# Patient Record
Sex: MALE | URBAN - METROPOLITAN AREA
[De-identification: names, ages, dates, MRNs, and addresses within clinical notes are randomized per-mention and may not be internally consistent; named-entity substitution may affect disease eponyms.]

---

## 2023-03-22 ENCOUNTER — APPOINTMENT (RX ONLY)
Dept: URBAN - METROPOLITAN AREA CLINIC 95 | Facility: CLINIC | Age: 58
Setting detail: DERMATOLOGY
End: 2023-03-22

## 2023-03-22 DIAGNOSIS — D49.2 NEOPLASM OF UNSPECIFIED BEHAVIOR OF BONE, SOFT TISSUE, AND SKIN: ICD-10-CM

## 2023-03-22 DIAGNOSIS — L72.8 OTHER FOLLICULAR CYSTS OF THE SKIN AND SUBCUTANEOUS TISSUE: ICD-10-CM

## 2023-03-22 DIAGNOSIS — L28.0 LICHEN SIMPLEX CHRONICUS: ICD-10-CM | Status: INADEQUATELY CONTROLLED

## 2023-03-22 PROBLEM — L30.9 DERMATITIS, UNSPECIFIED: Status: ACTIVE | Noted: 2023-03-22

## 2023-03-22 PROCEDURE — ? BIOPSY BY PUNCH METHOD

## 2023-03-22 PROCEDURE — 99214 OFFICE O/P EST MOD 30 MIN: CPT | Mod: 25

## 2023-03-22 PROCEDURE — ? PRESCRIPTION

## 2023-03-22 PROCEDURE — 11104 PUNCH BX SKIN SINGLE LESION: CPT

## 2023-03-22 PROCEDURE — ? COUNSELING

## 2023-03-22 RX ORDER — CLOBETASOL PROPIONATE 0.5 MG/G
OINTMENT TOPICAL TWICE DAILY
Qty: 60 | Refills: 1 | Status: ERX | COMMUNITY
Start: 2023-03-22

## 2023-03-22 RX ADMIN — CLOBETASOL PROPIONATE: 0.5 OINTMENT TOPICAL at 00:00

## 2023-03-22 ASSESSMENT — LOCATION DETAILED DESCRIPTION DERM
LOCATION DETAILED: RIGHT KNEE
LOCATION DETAILED: RIGHT LATERAL MALLEOLUS
LOCATION DETAILED: LEFT SUPERIOR UPPER BACK
LOCATION DETAILED: RIGHT ANKLE
LOCATION DETAILED: RIGHT KNEE

## 2023-03-22 ASSESSMENT — LOCATION SIMPLE DESCRIPTION DERM
LOCATION SIMPLE: LEFT UPPER BACK
LOCATION SIMPLE: RIGHT KNEE
LOCATION SIMPLE: RIGHT FOOT
LOCATION SIMPLE: RIGHT ANKLE
LOCATION SIMPLE: RIGHT KNEE

## 2023-03-22 ASSESSMENT — LOCATION ZONE DERM
LOCATION ZONE: TRUNK
LOCATION ZONE: LEG
LOCATION ZONE: LEG
LOCATION ZONE: FEET

## 2023-03-22 NOTE — PROCEDURE: BIOPSY BY PUNCH METHOD
Body Location Override (Optional - Billing Will Still Be Based On Selected Body Map Location If Applicable): right lateral ankle
Detail Level: Detailed
Was A Bandage Applied: Yes
Punch Size In Mm: 4
Size Of Lesion In Cm (Optional): 0
Depth Of Punch Biopsy: dermis
Biopsy Type: H and E
Anesthesia Type: 1% lidocaine with epinephrine
Anesthesia Volume In Cc (Will Not Render If 0): 0.5
Hemostasis: None
Epidermal Sutures: 4-0 Ethilon
Wound Care: Petrolatum
Dressing: bandage
Suture Removal: 14 days
Patient Will Remove Sutures At Home?: No
Lab: -[id number]
Consent: Written consent was obtained and risks were reviewed including but not limited to scarring, infection, bleeding, scabbing, incomplete removal, nerve damage and allergy to anesthesia.
Post-Care Instructions: I reviewed with the patient in detail post-care instructions. Patient is to keep the biopsy site dry overnight, and then apply bacitracin twice daily until healed. Patient may apply hydrogen peroxide soaks to remove any crusting.
Home Suture Removal Text: Patient was provided a home suture removal kit and will remove their sutures at home. If they have any questions or difficulties they will call the office.
Notification Instructions: Patient will be notified of biopsy results. However, patient instructed to call the office if not contacted within 2 weeks.
Billing Type: [employer]
Information: Selecting Yes will display possible errors in your note based on the variables you have selected. This validation is only offered as a suggestion for you. PLEASE NOTE THAT THE VALIDATION TEXT WILL BE REMOVED WHEN YOU FINALIZE YOUR NOTE. IF YOU WANT TO FAX A PRELIMINARY NOTE YOU WILL NEED TO TOGGLE THIS TO 'NO' IF YOU DO NOT WANT IT IN YOUR FAXED NOTE.

## 2023-04-05 ENCOUNTER — APPOINTMENT (RX ONLY)
Dept: URBAN - METROPOLITAN AREA CLINIC 95 | Facility: CLINIC | Age: 58
Setting detail: DERMATOLOGY
End: 2023-04-05

## 2023-04-05 DIAGNOSIS — L81.0 POSTINFLAMMATORY HYPERPIGMENTATION: ICD-10-CM

## 2023-04-05 DIAGNOSIS — Z48.02 ENCOUNTER FOR REMOVAL OF SUTURES: ICD-10-CM

## 2023-04-05 DIAGNOSIS — L43.0 HYPERTROPHIC LICHEN PLANUS: ICD-10-CM | Status: INADEQUATELY CONTROLLED

## 2023-04-05 PROCEDURE — 99024 POSTOP FOLLOW-UP VISIT: CPT

## 2023-04-05 PROCEDURE — ? PRESCRIPTION MEDICATION MANAGEMENT

## 2023-04-05 PROCEDURE — 99213 OFFICE O/P EST LOW 20 MIN: CPT | Mod: 25

## 2023-04-05 PROCEDURE — ? SUTURE REMOVAL (GLOBAL PERIOD)

## 2023-04-05 PROCEDURE — ? INTRALESIONAL KENALOG

## 2023-04-05 PROCEDURE — 11901 INJECT SKIN LESIONS >7: CPT

## 2023-04-05 PROCEDURE — ? COUNSELING

## 2023-04-05 ASSESSMENT — LOCATION SIMPLE DESCRIPTION DERM
LOCATION SIMPLE: LEFT PRETIBIAL REGION
LOCATION SIMPLE: RIGHT PRETIBIAL REGION
LOCATION SIMPLE: RIGHT KNEE
LOCATION SIMPLE: LEFT PRETIBIAL REGION
LOCATION SIMPLE: RIGHT PRETIBIAL REGION
LOCATION SIMPLE: LEFT KNEE
LOCATION SIMPLE: LEFT KNEE

## 2023-04-05 ASSESSMENT — LOCATION DETAILED DESCRIPTION DERM
LOCATION DETAILED: RIGHT KNEE
LOCATION DETAILED: RIGHT DISTAL PRETIBIAL REGION
LOCATION DETAILED: RIGHT DISTAL PRETIBIAL REGION
LOCATION DETAILED: LEFT KNEE
LOCATION DETAILED: RIGHT LATERAL DISTAL PRETIBIAL REGION
LOCATION DETAILED: LEFT DISTAL PRETIBIAL REGION
LOCATION DETAILED: LEFT DISTAL PRETIBIAL REGION
LOCATION DETAILED: LEFT KNEE

## 2023-04-05 ASSESSMENT — LOCATION ZONE DERM
LOCATION ZONE: LEG

## 2023-04-05 NOTE — PROCEDURE: SUTURE REMOVAL (GLOBAL PERIOD)
Detail Level: Detailed
Add 20822 Cpt? (Important Note: In 2017 The Use Of 35720 Is Being Tracked By Cms To Determine Future Global Period Reimbursement For Global Periods): yes
Add 1585x Cpt? (Do Not Bill If You Billed For The Procedure Placing The Sutures. This Is An Add-On Code That Must Be Billed With An E/M Visit Code): No

## 2023-04-05 NOTE — PROCEDURE: COUNSELING
Detail Level: Detailed
Patient Specific Counseling (Will Not Stick From Patient To Patient): discussed alternative treatment strategies should he fail to improve with ILK include nbUVB and soriatane if needed
no blurred vision R/no diplopia/no photophobia/no pain R/no loss of vision L/no blurred vision L/no loss of vision R/no pain L

## 2023-04-05 NOTE — PROCEDURE: INTRALESIONAL KENALOG
Lot # (Optional): [id number]
How Many Mls Were Removed From The 40 Mg/Ml (10ml) Vial When Preparing The Injectable Solution?: 0
Ndc# For Kenalog Only: 7717-6305-48
Administered By (Optional): Khadar Hensley MD
Validate Note Data When Using Inventory: Yes
Kenalog Preparation: Kenalog
Medical Necessity Clause: This procedure was medically necessary because the lesions that were treated were:
Show Inventory Tab: Hide
Detail Level: Detailed
Expiration Date (Optional): Jun 2024
Include Z78.9 (Other Specified Conditions Influencing Health Status) As An Associated Diagnosis?: No
Consent: The risks of atrophy were reviewed with the patient.
Total Volume Injected (Ccs- Only Use Numbers And Decimals): 4.7
Concentration Of Solution Injected (Mg/Ml): 10.0

## 2023-04-05 NOTE — PROCEDURE: PRESCRIPTION MEDICATION MANAGEMENT
Continue Regimen: clobetasol ointment two weeks on 1 week off
Detail Level: Simple
Render In Strict Bullet Format?: No

## 2023-04-17 ENCOUNTER — APPOINTMENT (RX ONLY)
Dept: URBAN - METROPOLITAN AREA CLINIC 95 | Facility: CLINIC | Age: 58
Setting detail: DERMATOLOGY
End: 2023-04-17

## 2023-04-17 DIAGNOSIS — L43.0 HYPERTROPHIC LICHEN PLANUS: ICD-10-CM | Status: STABLE

## 2023-04-17 DIAGNOSIS — L72.8 OTHER FOLLICULAR CYSTS OF THE SKIN AND SUBCUTANEOUS TISSUE: ICD-10-CM

## 2023-04-17 DIAGNOSIS — Z01.818 ENCOUNTER FOR OTHER PREPROCEDURAL EXAMINATION: ICD-10-CM

## 2023-04-17 PROCEDURE — 99213 OFFICE O/P EST LOW 20 MIN: CPT

## 2023-04-17 PROCEDURE — ? COUNSELING

## 2023-04-17 PROCEDURE — ? PRESCRIPTION MEDICATION MANAGEMENT

## 2023-04-17 ASSESSMENT — LOCATION DETAILED DESCRIPTION DERM
LOCATION DETAILED: RIGHT DISTAL PRETIBIAL REGION
LOCATION DETAILED: RIGHT SUPERIOR UPPER BACK

## 2023-04-17 ASSESSMENT — LOCATION SIMPLE DESCRIPTION DERM
LOCATION SIMPLE: RIGHT UPPER BACK
LOCATION SIMPLE: RIGHT PRETIBIAL REGION

## 2023-04-17 ASSESSMENT — LOCATION ZONE DERM
LOCATION ZONE: LEG
LOCATION ZONE: TRUNK

## 2023-04-17 NOTE — PROCEDURE: COUNSELING
Patient Specific Counseling (Will Not Stick From Patient To Patient): treated with ILK about 2 weeks ago, pruritus improved, though still thick plaques. \\n\\nWill plan to re-inject and increase potency of kenalog in 2 weeks.
Detail Level: Detailed
Detail Level: Generalized

## 2023-04-17 NOTE — PROCEDURE: PRESCRIPTION MEDICATION MANAGEMENT
Render In Strict Bullet Format?: No
Detail Level: Simple
Continue Regimen: clobetasol ointment bid 2 weeks on 1 week off

## 2023-05-08 ENCOUNTER — APPOINTMENT (RX ONLY)
Dept: URBAN - METROPOLITAN AREA CLINIC 95 | Facility: CLINIC | Age: 58
Setting detail: DERMATOLOGY
End: 2023-05-08

## 2023-05-08 DIAGNOSIS — L43.0 HYPERTROPHIC LICHEN PLANUS: ICD-10-CM

## 2023-05-08 DIAGNOSIS — L72.8 OTHER FOLLICULAR CYSTS OF THE SKIN AND SUBCUTANEOUS TISSUE: ICD-10-CM

## 2023-05-08 PROCEDURE — ? EXCISION

## 2023-05-08 PROCEDURE — 12032 INTMD RPR S/A/T/EXT 2.6-7.5: CPT

## 2023-05-08 PROCEDURE — 11900 INJECT SKIN LESIONS </W 7: CPT | Mod: 59

## 2023-05-08 PROCEDURE — ? INTRALESIONAL KENALOG

## 2023-05-08 PROCEDURE — 11403 EXC TR-EXT B9+MARG 2.1-3CM: CPT

## 2023-05-08 ASSESSMENT — LOCATION DETAILED DESCRIPTION DERM
LOCATION DETAILED: LEFT SUPERIOR UPPER BACK
LOCATION DETAILED: RIGHT PROXIMAL PRETIBIAL REGION

## 2023-05-08 ASSESSMENT — LOCATION SIMPLE DESCRIPTION DERM
LOCATION SIMPLE: LEFT UPPER BACK
LOCATION SIMPLE: RIGHT PRETIBIAL REGION

## 2023-05-08 ASSESSMENT — LOCATION ZONE DERM
LOCATION ZONE: LEG
LOCATION ZONE: TRUNK

## 2023-05-08 ASSESSMENT — BSA RASH: BSA RASH: 14

## 2023-05-08 NOTE — PROCEDURE: EXCISION

## 2023-05-08 NOTE — PROCEDURE: INTRALESIONAL KENALOG
Bill For Wasted Drug?: no
Kenalog Preparation: Kenalog
How Many Mls Were Removed From The 40 Mg/Ml (5ml) Vial When Preparing The Injectable Solution?: 0
Ndc# For Kenalog Only: 7670-2583-29
Medical Necessity Clause: This procedure was medically necessary because the lesions that were treated were:
Validate Note Data When Using Inventory: Yes
Lot # (Optional): [id number]
Total Volume Injected (Ccs- Only Use Numbers And Decimals): 2ccâs
Administered By (Optional): Khadar Hensley
Detail Level: Detailed
Concentration Of Solution Injected (Mg/Ml): 20.0
Expiration Date (Optional): 6/24
Show Inventory Tab: Hide
Consent: The risks of atrophy were reviewed with the patient.

## 2023-05-17 ENCOUNTER — APPOINTMENT (RX ONLY)
Dept: URBAN - METROPOLITAN AREA CLINIC 95 | Facility: CLINIC | Age: 58
Setting detail: DERMATOLOGY
End: 2023-05-17

## 2023-05-17 DIAGNOSIS — L43.0 HYPERTROPHIC LICHEN PLANUS: ICD-10-CM

## 2023-05-17 PROCEDURE — ? COUNSELING

## 2023-05-17 PROCEDURE — ? ORDER TESTS

## 2023-05-17 PROCEDURE — 99214 OFFICE O/P EST MOD 30 MIN: CPT | Mod: 24

## 2023-05-17 PROCEDURE — ? PRESCRIPTION MEDICATION MANAGEMENT

## 2023-05-17 ASSESSMENT — LOCATION DETAILED DESCRIPTION DERM
LOCATION DETAILED: RIGHT DISTAL PRETIBIAL REGION
LOCATION DETAILED: RIGHT KNEE

## 2023-05-17 ASSESSMENT — BSA RASH: BSA RASH: 10

## 2023-05-17 ASSESSMENT — LOCATION SIMPLE DESCRIPTION DERM
LOCATION SIMPLE: RIGHT PRETIBIAL REGION
LOCATION SIMPLE: RIGHT KNEE

## 2023-05-17 ASSESSMENT — LOCATION ZONE DERM: LOCATION ZONE: LEG

## 2023-05-17 NOTE — PROCEDURE: COUNSELING
Detail Level: Detailed
Patient Specific Counseling (Will Not Stick From Patient To Patient): ** discussed nbUVB therapy, working with staff to determine closest nbUVB box in our area as he wishes to proceed with trialing this option

## 2023-05-17 NOTE — PROCEDURE: ORDER TESTS
Billing Type: [employer]
Lab Facility: 0
Expected Date Of Service: 05/17/2023
Performing Laboratory: -[id number]
Bill For Surgical Tray: no

## 2023-05-17 NOTE — PROCEDURE: PRESCRIPTION MEDICATION MANAGEMENT
Continue Regimen: clobetasol ointment bid up to 2-3 weeks on then 1 week off.  As of now, pt does not find much benefit with topical treatment
Render In Strict Bullet Format?: No
Detail Level: Simple

## 2023-05-24 ENCOUNTER — APPOINTMENT (RX ONLY)
Dept: URBAN - METROPOLITAN AREA CLINIC 95 | Facility: CLINIC | Age: 58
Setting detail: DERMATOLOGY
End: 2023-05-24

## 2023-05-24 DIAGNOSIS — L43.0 HYPERTROPHIC LICHEN PLANUS: ICD-10-CM

## 2023-05-24 PROCEDURE — 99214 OFFICE O/P EST MOD 30 MIN: CPT | Mod: 25

## 2023-05-24 PROCEDURE — ? PRESCRIPTION MEDICATION MANAGEMENT

## 2023-05-24 PROCEDURE — ? SEPARATE AND IDENTIFIABLE DOCUMENTATION

## 2023-05-24 PROCEDURE — ? COUNSELING

## 2023-05-24 PROCEDURE — 11900 INJECT SKIN LESIONS </W 7: CPT

## 2023-05-24 PROCEDURE — ? INTRALESIONAL KENALOG

## 2023-05-24 ASSESSMENT — LOCATION DETAILED DESCRIPTION DERM
LOCATION DETAILED: RIGHT DISTAL PRETIBIAL REGION
LOCATION DETAILED: RIGHT LATERAL DISTAL PRETIBIAL REGION

## 2023-05-24 ASSESSMENT — LOCATION ZONE DERM: LOCATION ZONE: LEG

## 2023-05-24 ASSESSMENT — LOCATION SIMPLE DESCRIPTION DERM: LOCATION SIMPLE: RIGHT PRETIBIAL REGION

## 2023-05-24 NOTE — PROCEDURE: INTRALESIONAL KENALOG
X Size Of Lesion In Cm (Optional): 0
Validate Note Data When Using Inventory: Yes
Lot # (Optional): [id number]
Ndc# For Kenalog Only: 3769-5824-27
Show Inventory Tab: Hide
Medical Necessity Clause: This procedure was medically necessary because the lesions that were treated were:
Bill For Wasted Drug?: no
Kenalog Preparation: Kenalog
Detail Level: Detailed
Concentration Of Solution Injected (Mg/Ml): 40.0
Total Volume Injected (Ccs- Only Use Numbers And Decimals): 2.0
Consent: The risks of atrophy were reviewed with the patient.
Expiration Date (Optional): 06/2024
Administered By (Optional): Mahesh eMdina

## 2023-05-24 NOTE — PROCEDURE: PRESCRIPTION MEDICATION MANAGEMENT
Render In Strict Bullet Format?: No
Continue Regimen: CLobetasol ointment bid 2 weeks on 1 week off prn flares
Detail Level: Simple

## 2023-08-02 ENCOUNTER — RX ONLY (OUTPATIENT)
Age: 58
Setting detail: RX ONLY
End: 2023-08-02

## 2023-08-02 ENCOUNTER — APPOINTMENT (RX ONLY)
Dept: URBAN - METROPOLITAN AREA CLINIC 95 | Facility: CLINIC | Age: 58
Setting detail: DERMATOLOGY
End: 2023-08-02

## 2023-08-02 DIAGNOSIS — L43.0 HYPERTROPHIC LICHEN PLANUS: ICD-10-CM | Status: IMPROVED

## 2023-08-02 PROCEDURE — ? SEPARATE AND IDENTIFIABLE DOCUMENTATION

## 2023-08-02 PROCEDURE — 11900 INJECT SKIN LESIONS </W 7: CPT

## 2023-08-02 PROCEDURE — ? INTRALESIONAL KENALOG

## 2023-08-02 PROCEDURE — 99213 OFFICE O/P EST LOW 20 MIN: CPT | Mod: 25

## 2023-08-02 PROCEDURE — ? COUNSELING

## 2023-08-02 PROCEDURE — ? PRESCRIPTION MEDICATION MANAGEMENT

## 2023-08-02 RX ORDER — CLOBETASOL PROPIONATE 0.5 MG/G
OINTMENT TOPICAL TWICE DAILY
Qty: 60 | Refills: 2 | Status: ERX

## 2023-08-02 ASSESSMENT — LOCATION DETAILED DESCRIPTION DERM
LOCATION DETAILED: RIGHT LATERAL DISTAL PRETIBIAL REGION
LOCATION DETAILED: RIGHT DISTAL PRETIBIAL REGION

## 2023-08-02 ASSESSMENT — LOCATION SIMPLE DESCRIPTION DERM: LOCATION SIMPLE: RIGHT PRETIBIAL REGION

## 2023-08-02 ASSESSMENT — LOCATION ZONE DERM: LOCATION ZONE: LEG

## 2023-08-02 NOTE — PROCEDURE: INTRALESIONAL KENALOG
Administered By (Optional): Reynaldo Blanco
Consent: The risks of atrophy were reviewed with the patient.
How Many Mls Were Removed From The 10 Mg/Ml (5ml) Vial When Preparing The Injectable Solution?: 0
Lot # (Optional): [id number]
Expiration Date (Optional): 06/2024
Detail Level: Detailed
Concentration Of Solution Injected (Mg/Ml): 20.0
Include Z78.9 (Other Specified Conditions Influencing Health Status) As An Associated Diagnosis?: No
Ndc# For Kenalog Only: 3982-2032-99
Total Volume Injected (Ccs- Only Use Numbers And Decimals): 2.0
Show Inventory Tab: Hide
Validate Note Data When Using Inventory: Yes
Kenalog Preparation: Kenalog
Medical Necessity Clause: This procedure was medically necessary because the lesions that were treated were:
Kenalog Type Of Vial: Multiple Dose

## 2024-04-09 ENCOUNTER — APPOINTMENT (RX ONLY)
Dept: URBAN - METROPOLITAN AREA CLINIC 95 | Facility: CLINIC | Age: 59
Setting detail: DERMATOLOGY
End: 2024-04-09

## 2024-04-09 ENCOUNTER — RX ONLY (OUTPATIENT)
Age: 59
Setting detail: RX ONLY
End: 2024-04-09

## 2024-04-09 DIAGNOSIS — L43.0 HYPERTROPHIC LICHEN PLANUS: ICD-10-CM

## 2024-04-09 PROCEDURE — ? PRESCRIPTION MEDICATION MANAGEMENT

## 2024-04-09 PROCEDURE — 99213 OFFICE O/P EST LOW 20 MIN: CPT | Mod: 25

## 2024-04-09 PROCEDURE — 11900 INJECT SKIN LESIONS </W 7: CPT

## 2024-04-09 PROCEDURE — ? INTRALESIONAL KENALOG

## 2024-04-09 PROCEDURE — ? SEPARATE AND IDENTIFIABLE DOCUMENTATION

## 2024-04-09 PROCEDURE — ? COUNSELING

## 2024-04-09 RX ORDER — CLOBETASOL PROPIONATE 0.5 MG/G
PEA-SIZED AMOUNT OINTMENT TOPICAL
Qty: 60 | Refills: 3 | Status: ERX

## 2024-04-09 ASSESSMENT — LOCATION SIMPLE DESCRIPTION DERM: LOCATION SIMPLE: RIGHT PRETIBIAL REGION

## 2024-04-09 ASSESSMENT — LOCATION ZONE DERM: LOCATION ZONE: LEG

## 2024-04-09 NOTE — PROCEDURE: INTRALESIONAL KENALOG
Administered By (Optional): gopi Trinidad
Consent: The risks of atrophy were reviewed with the patient.
How Many Mls Were Removed From The 10 Mg/Ml (5ml) Vial When Preparing The Injectable Solution?: 0
Lot # For Kenalog (Optional): [id number]
Expiration Date For [place] (Optional): 09/2024
Detail Level: Detailed
Concentration Of Kenalog Solution Injected (Mg/Ml): 20.0
Include Z78.9 (Other Specified Conditions Influencing Health Status) As An Associated Diagnosis?: No
Ndc# For Kenalog Only: 2432-2346-50
Total Volume (Ccs): 2.0
Show Inventory Tab: Hide
Validate Note Data When Using Inventory: Yes
Kenalog Preparation: Kenalog
Medical Necessity Clause: This procedure was medically necessary because the lesions that were treated were:
Kenalog Type Of Vial: Multiple Dose

## 2024-10-02 ENCOUNTER — APPOINTMENT (RX ONLY)
Dept: URBAN - METROPOLITAN AREA CLINIC 95 | Facility: CLINIC | Age: 59
Setting detail: DERMATOLOGY
End: 2024-10-02

## 2024-10-02 DIAGNOSIS — L72.8 OTHER FOLLICULAR CYSTS OF THE SKIN AND SUBCUTANEOUS TISSUE: ICD-10-CM

## 2024-10-02 DIAGNOSIS — L81.0 POSTINFLAMMATORY HYPERPIGMENTATION: ICD-10-CM

## 2024-10-02 DIAGNOSIS — L43.0 HYPERTROPHIC LICHEN PLANUS: ICD-10-CM | Status: WELL CONTROLLED

## 2024-10-02 DIAGNOSIS — Z01.818 ENCOUNTER FOR OTHER PREPROCEDURAL EXAMINATION: ICD-10-CM

## 2024-10-02 PROCEDURE — ? COUNSELING

## 2024-10-02 PROCEDURE — ? PRESCRIPTION MEDICATION MANAGEMENT

## 2024-10-02 PROCEDURE — ? SEPARATE AND IDENTIFIABLE DOCUMENTATION

## 2024-10-02 PROCEDURE — 99214 OFFICE O/P EST MOD 30 MIN: CPT

## 2024-10-02 ASSESSMENT — LOCATION ZONE DERM
LOCATION ZONE: HAND
LOCATION ZONE: TRUNK
LOCATION ZONE: LEG

## 2024-10-02 ASSESSMENT — LOCATION DETAILED DESCRIPTION DERM
LOCATION DETAILED: RIGHT DORSAL MIDDLE METACARPOPHALANGEAL JOINT
LOCATION DETAILED: LEFT SUPERIOR UPPER BACK
LOCATION DETAILED: RIGHT DISTAL PRETIBIAL REGION
LOCATION DETAILED: LEFT PROXIMAL PRETIBIAL REGION

## 2024-10-02 ASSESSMENT — LOCATION SIMPLE DESCRIPTION DERM
LOCATION SIMPLE: RIGHT PRETIBIAL REGION
LOCATION SIMPLE: RIGHT HAND
LOCATION SIMPLE: LEFT PRETIBIAL REGION
LOCATION SIMPLE: LEFT UPPER BACK

## 2024-10-02 NOTE — PROCEDURE: PRESCRIPTION MEDICATION MANAGEMENT
Render In Strict Bullet Format?: No
Continue Regimen: CLobetasol ointment bid 2 weeks on 1 week off prn flares, will let me know if pruritus returns despite use of topical therapy\\n\\ndid develop some hypopigmentation at sites of prior treatment/ILK, reassured that this likely will re pigment with time. Disease process itself can also result in hypopigmentation
Detail Level: Simple

## 2024-10-14 ENCOUNTER — APPOINTMENT (RX ONLY)
Dept: URBAN - METROPOLITAN AREA CLINIC 95 | Facility: CLINIC | Age: 59
Setting detail: DERMATOLOGY
End: 2024-10-14

## 2024-10-14 DIAGNOSIS — L72.8 OTHER FOLLICULAR CYSTS OF THE SKIN AND SUBCUTANEOUS TISSUE: ICD-10-CM

## 2024-10-14 PROCEDURE — 11404 EXC TR-EXT B9+MARG 3.1-4 CM: CPT

## 2024-10-14 PROCEDURE — ? EXCISION

## 2024-10-14 PROCEDURE — 12032 INTMD RPR S/A/T/EXT 2.6-7.5: CPT

## 2024-10-14 ASSESSMENT — LOCATION SIMPLE DESCRIPTION DERM: LOCATION SIMPLE: LEFT UPPER BACK

## 2024-10-14 ASSESSMENT — LOCATION DETAILED DESCRIPTION DERM: LOCATION DETAILED: LEFT SUPERIOR UPPER BACK

## 2024-10-14 ASSESSMENT — LOCATION ZONE DERM: LOCATION ZONE: TRUNK

## 2024-10-14 NOTE — PROCEDURE: EXCISION

## 2025-04-15 ENCOUNTER — APPOINTMENT (OUTPATIENT)
Dept: URBAN - METROPOLITAN AREA CLINIC 95 | Facility: CLINIC | Age: 60
Setting detail: DERMATOLOGY
End: 2025-04-15

## 2025-04-15 DIAGNOSIS — L82.0 INFLAMED SEBORRHEIC KERATOSIS: ICD-10-CM

## 2025-04-15 DIAGNOSIS — L43.0 HYPERTROPHIC LICHEN PLANUS: ICD-10-CM

## 2025-04-15 PROCEDURE — ? COUNSELING

## 2025-04-15 PROCEDURE — ? INTRALESIONAL KENALOG

## 2025-04-15 PROCEDURE — ? SEPARATE AND IDENTIFIABLE DOCUMENTATION

## 2025-04-15 PROCEDURE — 99214 OFFICE O/P EST MOD 30 MIN: CPT | Mod: 25

## 2025-04-15 PROCEDURE — ? PRESCRIPTION MEDICATION MANAGEMENT

## 2025-04-15 PROCEDURE — ? LIQUID NITROGEN

## 2025-04-15 PROCEDURE — 11900 INJECT SKIN LESIONS </W 7: CPT | Mod: 59

## 2025-04-15 PROCEDURE — 17110 DESTRUCTION B9 LES UP TO 14: CPT

## 2025-04-15 ASSESSMENT — SEVERITY ASSESSMENT: SEVERITY: MODERATE TO SEVERE

## 2025-04-15 ASSESSMENT — LOCATION ZONE DERM
LOCATION ZONE: LEG
LOCATION ZONE: EYELID

## 2025-04-15 ASSESSMENT — LOCATION DETAILED DESCRIPTION DERM
LOCATION DETAILED: RIGHT PROXIMAL PRETIBIAL REGION
LOCATION DETAILED: LEFT ANTERIOR LATERAL PROXIMAL THIGH
LOCATION DETAILED: RIGHT MEDIAL INFERIOR EYELID
LOCATION DETAILED: RIGHT KNEE
LOCATION DETAILED: RIGHT DISTAL PRETIBIAL REGION

## 2025-04-15 ASSESSMENT — LOCATION SIMPLE DESCRIPTION DERM
LOCATION SIMPLE: RIGHT PRETIBIAL REGION
LOCATION SIMPLE: LEFT THIGH
LOCATION SIMPLE: RIGHT KNEE
LOCATION SIMPLE: RIGHT INFERIOR EYELID

## 2025-04-15 ASSESSMENT — BSA RASH: BSA RASH: 10

## 2025-04-15 NOTE — PROCEDURE: INTRALESIONAL KENALOG
Expiration Date For Kenalog (Optional): APR 2026
Include Z78.9 (Other Specified Conditions Influencing Health Status) As An Associated Diagnosis?: No
Show Inventory Tab: Hide
Consent: The risks of atrophy were reviewed with the patient.
Kenalog Type Of Vial: Multiple Dose
How Many Mls Were Removed From The 10 Mg/Ml (5ml) Vial When Preparing The Injectable Solution?: 0
Administered By (Optional): Ivana Trinidad MD
Ndc# For Kenalog Only: 3723-1882-17
Concentration Of Kenalog Solution Injected (Mg/Ml): 40.0
Kenalog Preparation: Kenalog
Lot # For Kenalog (Optional): [id number]
Validate Note Data When Using Inventory: Yes
Medical Necessity Clause: This procedure was medically necessary because the lesions that were treated were:
Detail Level: Detailed
Total Volume (Ccs): 1.0

## 2025-04-15 NOTE — PROCEDURE: LIQUID NITROGEN
Add 52 Modifier (Optional): no
Show Applicator Variable?: Yes
Spray Paint Text: The liquid nitrogen was applied to the skin utilizing a spray paint frosting technique.
Consent: The patient's consent was obtained including but not limited to risks of crusting, scabbing, blistering, scarring, darker or lighter pigmentary change, recurrence, incomplete removal and infection.
Detail Level: Detailed
Number Of Freeze-Thaw Cycles: 2 freeze-thaw cycles
Medical Necessity Clause: This procedure was medically necessary because the lesions that were treated were:
Medical Necessity Information: It is in your best interest to select a reason for this procedure from the list below. All of these items fulfill various CMS LCD requirements except the new and changing color options.
Post-Care Instructions: I reviewed with the patient in detail post-care instructions. Patient is to wear sunprotection, and avoid picking at any of the treated lesions. Pt may apply Vaseline to crusted or scabbing areas.

## 2025-04-15 NOTE — PROCEDURE: PRESCRIPTION MEDICATION MANAGEMENT
Render In Strict Bullet Format?: No
Continue Regimen: Clobetasol ointment bid 2 weeks on 1 week off prn flares, will let me know if pruritus returns despite use of topical therapy\\n\\ndid develop some hypopigmentation at sites of prior treatment/ILK, this has resolved\\n\\nfocal ILK treatment to knee today
Detail Level: Simple

## 2025-05-13 ENCOUNTER — RX ONLY (RX ONLY)
Age: 60
End: 2025-05-13

## 2025-05-13 ENCOUNTER — APPOINTMENT (OUTPATIENT)
Dept: URBAN - METROPOLITAN AREA CLINIC 95 | Facility: CLINIC | Age: 60
Setting detail: DERMATOLOGY
End: 2025-05-13

## 2025-05-13 DIAGNOSIS — L43.0 HYPERTROPHIC LICHEN PLANUS: ICD-10-CM | Status: IMPROVED

## 2025-05-13 PROCEDURE — ? PRESCRIPTION MEDICATION MANAGEMENT

## 2025-05-13 PROCEDURE — ? INTRALESIONAL KENALOG

## 2025-05-13 PROCEDURE — ? COUNSELING

## 2025-05-13 PROCEDURE — 11900 INJECT SKIN LESIONS </W 7: CPT

## 2025-05-13 PROCEDURE — 99213 OFFICE O/P EST LOW 20 MIN: CPT | Mod: 25

## 2025-05-13 PROCEDURE — ? SEPARATE AND IDENTIFIABLE DOCUMENTATION

## 2025-05-13 RX ORDER — CLOBETASOL PROPIONATE 0.5 MG/G
PEA-SIZED AMOUNT OINTMENT TOPICAL
Qty: 60 | Refills: 3 | Status: ERX

## 2025-05-13 ASSESSMENT — LOCATION SIMPLE DESCRIPTION DERM
LOCATION SIMPLE: RIGHT PRETIBIAL REGION
LOCATION SIMPLE: RIGHT KNEE

## 2025-05-13 ASSESSMENT — LOCATION DETAILED DESCRIPTION DERM
LOCATION DETAILED: RIGHT PROXIMAL PRETIBIAL REGION
LOCATION DETAILED: RIGHT KNEE
LOCATION DETAILED: RIGHT DISTAL PRETIBIAL REGION

## 2025-05-13 ASSESSMENT — LOCATION ZONE DERM: LOCATION ZONE: LEG

## 2025-05-13 NOTE — PROCEDURE: INTRALESIONAL KENALOG
Expiration Date For Kenalog (Optional): APR 2026
Include Z78.9 (Other Specified Conditions Influencing Health Status) As An Associated Diagnosis?: No
Show Inventory Tab: Hide
Consent: The risks of atrophy were reviewed with the patient.
Kenalog Type Of Vial: Multiple Dose
How Many Mls Were Removed From The 10 Mg/Ml (5ml) Vial When Preparing The Injectable Solution?: 0
Administered By (Optional): Ivana Trinidad MD
Ndc# For Kenalog Only: 6455-8336-73
Concentration Of Kenalog Solution Injected (Mg/Ml): 40.0
Which Kenalog Vial Was Used?: Kenalog 40 mg/ml (1 ml vial)
Kenalog Preparation: Kenalog
Lot # For Kenalog (Optional): [id number]
Validate Note Data When Using Inventory: Yes
Medical Necessity Clause: This procedure was medically necessary because the lesions that were treated were:
Detail Level: Detailed
Total Volume (Ccs): 1.0

## 2025-05-13 NOTE — PROCEDURE: PRESCRIPTION MEDICATION MANAGEMENT
Render In Strict Bullet Format?: No
Continue Regimen: Clobetasol ointment bid 2 weeks on 1 week off prn flares, will let me know if pruritus returns despite use of topical therapy\\n\\ndid develop some hypopigmentation at sites of prior treatment/ILK, this has resolved\\n\\nfocal ILK treatment to knee today\\n—
Detail Level: Simple
Plan: Clobetasol ointment refilled today.\\n—